# Patient Record
Sex: FEMALE | Race: WHITE | NOT HISPANIC OR LATINO | Employment: FULL TIME | URBAN - METROPOLITAN AREA
[De-identification: names, ages, dates, MRNs, and addresses within clinical notes are randomized per-mention and may not be internally consistent; named-entity substitution may affect disease eponyms.]

---

## 2020-09-15 ENCOUNTER — APPOINTMENT (OUTPATIENT)
Dept: PREADMISSION TESTING | Facility: HOSPITAL | Age: 20
End: 2020-09-15

## 2020-09-15 PROCEDURE — U0004 COV-19 TEST NON-CDC HGH THRU: HCPCS

## 2020-09-15 PROCEDURE — C9803 HOPD COVID-19 SPEC COLLECT: HCPCS

## 2020-09-16 LAB — SARS-COV-2 RNA NOSE QL NAA+PROBE: NOT DETECTED

## 2021-06-15 ENCOUNTER — OFFICE VISIT (OUTPATIENT)
Dept: OBSTETRICS AND GYNECOLOGY | Facility: CLINIC | Age: 21
End: 2021-06-15

## 2021-06-15 ENCOUNTER — TELEPHONE (OUTPATIENT)
Dept: OBSTETRICS AND GYNECOLOGY | Facility: CLINIC | Age: 21
End: 2021-06-15

## 2021-06-15 VITALS
WEIGHT: 121 LBS | DIASTOLIC BLOOD PRESSURE: 70 MMHG | HEIGHT: 67 IN | BODY MASS INDEX: 18.99 KG/M2 | SYSTOLIC BLOOD PRESSURE: 100 MMHG

## 2021-06-15 DIAGNOSIS — N76.0 ACUTE VAGINITIS: Primary | ICD-10-CM

## 2021-06-15 DIAGNOSIS — N92.0 SPOTTING: ICD-10-CM

## 2021-06-15 PROCEDURE — 99213 OFFICE O/P EST LOW 20 MIN: CPT | Performed by: OBSTETRICS & GYNECOLOGY

## 2021-06-15 PROCEDURE — 87210 SMEAR WET MOUNT SALINE/INK: CPT | Performed by: OBSTETRICS & GYNECOLOGY

## 2021-06-15 RX ORDER — FLUCONAZOLE 150 MG/1
150 TABLET ORAL
Qty: 2 TABLET | Refills: 0 | Status: SHIPPED | OUTPATIENT
Start: 2021-06-15 | End: 2021-06-19

## 2021-06-15 RX ORDER — LEVONORGESTREL/ETHINYL ESTRADIOL AND ETHINYL ESTRADIOL 100-20(84)
KIT ORAL
COMMUNITY
Start: 2021-05-15 | End: 2021-08-17 | Stop reason: SDUPTHER

## 2021-06-15 RX ORDER — DUPILUMAB 300 MG/2ML
INJECTION, SOLUTION SUBCUTANEOUS
COMMUNITY
Start: 2021-05-19

## 2021-06-15 NOTE — TELEPHONE ENCOUNTER
"OP pt c/o a \"polyp\" on the inside of the right labia reports it has been bleeding and burns when she urinates. Discussed with UNIQUE as she has openings today and she is ok with seeing her. Scheduled for 310 today. Instructed to leave a CCUA upon arrival. She VU   "

## 2021-06-17 LAB
A VAGINAE DNA VAG QL NAA+PROBE: NORMAL SCORE
BVAB2 DNA VAG QL NAA+PROBE: NORMAL SCORE
C ALBICANS DNA VAG QL NAA+PROBE: NEGATIVE
C GLABRATA DNA VAG QL NAA+PROBE: NEGATIVE
C TRACH DNA VAG QL NAA+PROBE: NEGATIVE
MEGA1 DNA VAG QL NAA+PROBE: NORMAL SCORE
N GONORRHOEA DNA VAG QL NAA+PROBE: NEGATIVE
T VAGINALIS DNA VAG QL NAA+PROBE: NEGATIVE

## 2021-07-01 LAB — WET PREP GENITAL: ABNORMAL

## 2021-08-17 ENCOUNTER — OFFICE VISIT (OUTPATIENT)
Dept: OBSTETRICS AND GYNECOLOGY | Facility: CLINIC | Age: 21
End: 2021-08-17

## 2021-08-17 VITALS
WEIGHT: 119.4 LBS | HEIGHT: 67 IN | SYSTOLIC BLOOD PRESSURE: 112 MMHG | DIASTOLIC BLOOD PRESSURE: 66 MMHG | BODY MASS INDEX: 18.74 KG/M2

## 2021-08-17 DIAGNOSIS — N93.9 ABNORMAL UTERINE BLEEDING (AUB): ICD-10-CM

## 2021-08-17 DIAGNOSIS — Z01.419 WOMEN'S ANNUAL ROUTINE GYNECOLOGICAL EXAMINATION: Primary | ICD-10-CM

## 2021-08-17 PROCEDURE — 99395 PREV VISIT EST AGE 18-39: CPT | Performed by: OBSTETRICS & GYNECOLOGY

## 2021-08-17 RX ORDER — LEVONORGESTREL/ETHINYL ESTRADIOL AND ETHINYL ESTRADIOL 100-20(84)
1 KIT ORAL DAILY
Qty: 91 TABLET | Refills: 3 | Status: SHIPPED | OUTPATIENT
Start: 2021-08-17

## 2021-08-17 NOTE — PROGRESS NOTES
GYN Annual Exam     CC - Here for annual exam.        HPI  Sarah Benito is a 21 y.o. female, , who presents for annual well woman exam. No LMP recorded. (Menstrual status: Oral contraceptives)..  Periods are regular every 25-35 days, lasting 4 days. .  Dysmenorrhea:none.  Patient reports problems with: on and off brown d/c for the last month. .  There were no changes to her medical or surgical history since her last visit.. Partner Status: Marital Status: single.  She is sexually active. She has not had new partners since her last STD testing. She does not desire STD testing. . She has had her HPV vaccines.     Additional OB/GYN History   Current contraception: contraceptive methods: pills  Desires to: continue contraception  Last Pap :   Last Completed Pap Smear     This patient has no relevant Health Maintenance data.        History of abnormal Pap smear: no  Family history of uterine, colon, breast, or ovarian cancer: no  Performs monthly Self-Breast Exam: no  Exercises Regularly:yes  Feelings of Anxiety or Depression: no  Tobacco Usage?: No   OB History        0    Para   0    Term   0       0    AB   0    Living   0       SAB   0    TAB   0    Ectopic   0    Molar   0    Multiple   0    Live Births   0                Health Maintenance   Topic Date Due   • Annual Gynecologic Pelvic and Breast Exam  Never done   • ANNUAL PHYSICAL  Never done   • HPV VACCINES (1 - 2-dose series) Never done   • COVID-19 Vaccine (1) Never done   • TDAP/TD VACCINES (1 - Tdap) Never done   • HEPATITIS C SCREENING  Never done   • PAP SMEAR  Never done   • INFLUENZA VACCINE  10/01/2021   • Pneumococcal Vaccine 0-64  Aged Out   • MENINGOCOCCAL VACCINE  Aged Out       The additional following portions of the patient's history were reviewed and updated as appropriate: allergies, current medications, past family history, past medical history, past social history, past surgical history and problem list.    Review of  "Systems   Constitutional: Negative.    HENT: Negative.    Eyes: Negative.    Respiratory: Negative.    Cardiovascular: Negative.    Gastrointestinal: Negative.    Endocrine: Negative.    Genitourinary: Positive for vaginal discharge.   Musculoskeletal: Negative.    Skin: Negative.    Allergic/Immunologic: Negative.    Neurological: Negative.    Hematological: Negative.    Psychiatric/Behavioral: Negative.          I have reviewed and agree with the HPI, ROS, and historical information as entered above. Bruna Melgoza MD    Objective   /66   Ht 170.2 cm (67.01\")   Wt 54.2 kg (119 lb 6.4 oz)   Breastfeeding No   BMI 18.70 kg/m²     Physical Exam  Vitals and nursing note reviewed. Exam conducted with a chaperone present.   Constitutional:       Appearance: She is well-developed.   HENT:      Head: Normocephalic and atraumatic.   Neck:      Thyroid: No thyroid mass or thyromegaly.   Cardiovascular:      Rate and Rhythm: Normal rate and regular rhythm.      Heart sounds: No murmur heard.     Pulmonary:      Effort: Pulmonary effort is normal. No retractions.      Breath sounds: Normal breath sounds. No wheezing, rhonchi or rales.   Chest:      Chest wall: No mass or tenderness.      Breasts:         Right: Normal. No mass, nipple discharge, skin change or tenderness.         Left: Normal. No mass, nipple discharge, skin change or tenderness.   Abdominal:      General: Bowel sounds are normal.      Palpations: Abdomen is soft. Abdomen is not rigid. There is no mass.      Tenderness: There is no abdominal tenderness. There is no guarding.      Hernia: No hernia is present. There is no hernia in the left inguinal area.   Genitourinary:     Labia:         Right: No rash, tenderness or lesion.         Left: No rash, tenderness or lesion.       Vagina: Normal. No vaginal discharge or lesions.      Cervix: No cervical motion tenderness, discharge, lesion or cervical bleeding.      Uterus: Normal. Not enlarged, not " fixed and not tender.       Adnexa:         Right: No mass or tenderness.          Left: No mass or tenderness.        Rectum: No external hemorrhoid.   Musculoskeletal:      Cervical back: Normal range of motion. No muscular tenderness.   Neurological:      Mental Status: She is alert and oriented to person, place, and time.   Psychiatric:         Behavior: Behavior normal.            Assessment and Plan    Problem List Items Addressed This Visit     None      Visit Diagnoses     Women's annual routine gynecological examination    -  Primary    Relevant Orders    Pap IG, Ct-Ng, Rfx HPV ASCU    Abnormal uterine bleeding (AUB)              1. GYN annual well woman exam.   2. OCP's/Vaginal Ring - Discussed side effects of nausea, BTB, headaches, breast tenderness and slight weight gain in the first three cycles.  Understands risks of blood clots, stroke, and theoretical risk of breast cancer.  Denies family history of blood clots.  3. Reviewed monthly self breast exams.  Instructed to call with lumps, pain, or breast discharge.    4. RTC in 1 year or PRN with problems  5. Other: Will try 2 pills a day and may need new if AUP persists   6. Will start 2 pills a day for 5 days Nita Melgoza MD  08/17/2021

## 2021-08-18 ENCOUNTER — TELEPHONE (OUTPATIENT)
Dept: OBSTETRICS AND GYNECOLOGY | Facility: CLINIC | Age: 21
End: 2021-08-18

## 2021-08-18 NOTE — TELEPHONE ENCOUNTER
Patient requested generic form of her birth control pill that MD sent; pharmacy verifying that it was ok. Verbal given and accepted.

## 2021-08-24 DIAGNOSIS — Z01.419 WOMEN'S ANNUAL ROUTINE GYNECOLOGICAL EXAMINATION: ICD-10-CM

## 2022-03-07 ENCOUNTER — TELEPHONE (OUTPATIENT)
Dept: OBSTETRICS AND GYNECOLOGY | Facility: CLINIC | Age: 22
End: 2022-03-07

## 2022-03-07 NOTE — TELEPHONE ENCOUNTER
PATIENT IS NEEDING AN STD SCREENING DONE. SHE SAID SHE WORKS TODAY AND TOMORROW. SHE HAS AN APPT ON 3/10 W/ DR. QUEVEDO. STATES WED WOULD BE BETTER. PLEASE ADVISE.

## 2022-03-10 ENCOUNTER — OFFICE VISIT (OUTPATIENT)
Dept: OBSTETRICS AND GYNECOLOGY | Facility: CLINIC | Age: 22
End: 2022-03-10

## 2022-03-10 VITALS
SYSTOLIC BLOOD PRESSURE: 110 MMHG | HEIGHT: 67 IN | DIASTOLIC BLOOD PRESSURE: 70 MMHG | BODY MASS INDEX: 18.68 KG/M2 | WEIGHT: 119 LBS

## 2022-03-10 DIAGNOSIS — Z11.3 SCREENING FOR STD (SEXUALLY TRANSMITTED DISEASE): Primary | ICD-10-CM

## 2022-03-10 DIAGNOSIS — B37.9 YEAST INFECTION: ICD-10-CM

## 2022-03-10 PROCEDURE — 99213 OFFICE O/P EST LOW 20 MIN: CPT | Performed by: OBSTETRICS & GYNECOLOGY

## 2022-03-10 RX ORDER — FLUCONAZOLE 150 MG/1
TABLET ORAL
Qty: 2 TABLET | Refills: 3 | Status: SHIPPED | OUTPATIENT
Start: 2022-03-10

## 2022-03-10 NOTE — PROGRESS NOTES
"Chief Complaint   Patient presents with   • Vaginal Itching         Subjective   HPI  Sarah Benito is a 22 y.o. female, , who presents for screening for STD's. Patient states thinks possible yeast. She has been using monistat OTC with some relief.     She is also wanting to restart ocps. States stopped 2021 due to BTB. She plans to start after next period. States no period 2022, 2 episodes of bleeding last month that lasted 7 days. Hospitals in [place] had 2 negative upt 2022.     Her last LMP was Patient's last menstrual period was 2022.. She reports sexual contact with individual with uncertain background unknown weeks ago. The patient reports vaginal discharge.  She describes this as clear and vulvar itching. These symptoms have been present for 5 day(s). She is requesting STD testing without blood work. The patient denies history of sexually transmitted disease..      Additional OB/GYN History   Last Pap :   Last Completed Pap Smear          Ordered - PAP SMEAR (Every 3 Years) Ordered on 2021  SCANNED - PAP SMEAR              History of abnormal Pap smear: no  OB History        0    Para   0    Term   0       0    AB   0    Living   0       SAB   0    IAB   0    Ectopic   0    Molar   0    Multiple   0    Live Births   0                The additional following portions of the patient's history were reviewed and updated as appropriate: allergies, current medications, past family history, past medical history, past social history and past surgical history.    Review of Systems   Genitourinary: Positive for vaginal discharge.     All other systems reviewed and are negative.     I have reviewed and agree with the HPI, ROS, and historical information as entered above. Bruna Melgoza MD    Objective   /70   Ht 170.2 cm (67\")   Wt 54 kg (119 lb)   LMP 2022   BMI 18.64 kg/m²     Physical Exam  Vitals and nursing note reviewed. Exam conducted with a chaperone " present.   Genitourinary:     Labia:         Right: No rash, tenderness or lesion.         Left: No rash, tenderness or lesion.       Vagina: Vaginal discharge present. No lesions.      Cervix: No cervical motion tenderness, discharge, lesion or cervical bleeding.      Uterus: Normal. Not enlarged, not fixed and not tender.       Adnexa:         Right: No mass or tenderness.          Left: No mass or tenderness.        Rectum: No external hemorrhoid.      Comments: Chaperone Present minor discharge         Wet mount performed? Yes. Pertinent positives include: Yeast Buds    Assessment/Plan     Assessment and Plan    Problem List Items Addressed This Visit    None     Visit Diagnoses     Screening for STD (sexually transmitted disease)    -  Primary    Relevant Orders    Chlamydia trachomatis, Neisseria gonorrhoeae, Trichomonas vaginalis, PCR - Swab, Cervix          1. Discussed safe sexual practice in detail  2. Appropriate educational material was distributed  3. See orders for STD cultures and assays  4. RTC PRN  5. Will rx for yeast and do STD cultures        Bruna Melgoza MD  03/10/2022

## 2022-03-12 LAB
C TRACH DNA SPEC QL NAA+PROBE: NEGATIVE
N GONORRHOEA DNA SPEC QL NAA+PROBE: NEGATIVE
T VAGINALIS DNA SPEC QL NAA+PROBE: NEGATIVE

## 2022-08-18 ENCOUNTER — OFFICE VISIT (OUTPATIENT)
Dept: OBSTETRICS AND GYNECOLOGY | Facility: CLINIC | Age: 22
End: 2022-08-18

## 2022-08-18 VITALS
DIASTOLIC BLOOD PRESSURE: 54 MMHG | BODY MASS INDEX: 19.56 KG/M2 | SYSTOLIC BLOOD PRESSURE: 100 MMHG | WEIGHT: 124.6 LBS | HEIGHT: 67 IN

## 2022-08-18 DIAGNOSIS — Z01.419 PAP TEST, AS PART OF ROUTINE GYNECOLOGICAL EXAMINATION: Primary | ICD-10-CM

## 2022-08-18 DIAGNOSIS — Z11.3 SCREENING EXAMINATION FOR STD (SEXUALLY TRANSMITTED DISEASE): ICD-10-CM

## 2022-08-18 DIAGNOSIS — N93.9 ABNORMAL UTERINE BLEEDING (AUB): ICD-10-CM

## 2022-08-18 PROCEDURE — 99395 PREV VISIT EST AGE 18-39: CPT | Performed by: OBSTETRICS & GYNECOLOGY

## 2022-08-18 NOTE — PROGRESS NOTES
Gynecologic Annual Exam Note        Gynecologic Exam        Subjective     HPI  Sarah Benito is a 22 y.o.  female who presents for annual well woman exam as a established patient. There were no changes to her medical or surgical history since her last visit.. Patient reports problems with: abnormal bleeding with and without birth control pills. Patient's last menstrual period was 2022.. Her periods are irregular occurring every 2  weeks, lasting 5 days. The flow is moderate. Dysmenorrhea:mild, occurring premenstrually and throughout menses. . Partner Status: Marital Status: single.  She is sexually active. She has had new partners.. STD testing recommendations have been explained to the patient and she does desire STD testing.    Additional OB/GYN History   Current contraception: contraceptive methods: Condoms  Desires to: change to possible IUD, discuss other options contraception  Thromboembolic Disease: none  Age of menarche: 12    History of STD: no    Last Pap : 2021. Results: negative. HPV: not done  Last Completed Pap Smear          Ordered - PAP SMEAR (Every 3 Years) Ordered on 2021  SCANNED - PAP SMEAR                 History of abnormal Pap smear: no  Gardasil status:completed  Family history of uterine, colon, breast, or ovarian cancer: no  Performs monthly Self-Breast Exam: no  Exercises Regularly:yes  Feelings of Anxiety or Depression: no  Tobacco Usage?: No       Current Outpatient Medications:   •  Dupixent 300 MG/2ML solution prefilled syringe, , Disp: , Rfl:   •  Camrese Lo 0.1-0.02 & 0.01 MG tablet, Take 1 tablet by mouth Daily., Disp: 91 tablet, Rfl: 3  •  fluconazole (Diflucan) 150 MG tablet, 1 po now and repeat 1 week, Disp: 2 tablet, Rfl: 3  •  terconazole (Terazol 7) 0.4 % vaginal cream, Apply thin film to vulva daily x 7 days, Disp: 45 g, Rfl: 0     Patient denies the need for medication refills today.    OB History        0    Para   0     "Term   0       0    AB   0    Living   0       SAB   0    IAB   0    Ectopic   0    Molar   0    Multiple   0    Live Births   0                Health Maintenance   Topic Date Due   • COVID-19 Vaccine (1) Never done   • ANNUAL PHYSICAL  Never done   • HPV VACCINES (1 - 2-dose series) Never done   • HEPATITIS C SCREENING  Never done   • TDAP/TD VACCINES (2 - Td or Tdap) 2021   • Annual Gynecologic Pelvic and Breast Exam  2022   • INFLUENZA VACCINE  10/01/2022   • CHLAMYDIA SCREENING  03/10/2023   • PAP SMEAR  2024   • Pneumococcal Vaccine 0-64  Aged Out   • MENINGOCOCCAL VACCINE  Aged Out       Past Medical History:   Diagnosis Date   • Abnormal uterine bleeding (AUB)         Past Surgical History:   Procedure Laterality Date   • NASAL SEPTUM SURGERY         The additional following portions of the patient's history were reviewed and updated as appropriate: allergies, current medications, past family history, past medical history, past social history and past surgical history.    Review of Systems   Constitutional: Negative.    HENT: Negative.    Respiratory: Negative.    Cardiovascular: Negative.    Gastrointestinal: Negative.    Genitourinary: Positive for menstrual problem.   Musculoskeletal: Negative.    Skin: Negative.    Allergic/Immunologic: Negative.    Neurological: Negative.    Hematological: Negative.    Psychiatric/Behavioral: Negative.          I have reviewed and agree with the HPI, ROS, and historical information as entered above. Bruna Melgoza MD        Objective   /54   Ht 170.2 cm (67\")   Wt 56.5 kg (124 lb 9.6 oz)   LMP 2022   BMI 19.52 kg/m²     Physical Exam  Vitals and nursing note reviewed. Exam conducted with a chaperone present.   Constitutional:       Appearance: She is well-developed.   HENT:      Head: Normocephalic and atraumatic.   Neck:      Thyroid: No thyroid mass or thyromegaly.   Cardiovascular:      Rate and Rhythm: Normal rate and regular " rhythm.      Heart sounds: No murmur heard.  Pulmonary:      Effort: Pulmonary effort is normal. No retractions.      Breath sounds: Normal breath sounds. No wheezing, rhonchi or rales.   Chest:      Chest wall: No mass or tenderness.   Breasts:      Right: Normal. No mass, nipple discharge, skin change or tenderness.      Left: Normal. No mass, nipple discharge, skin change or tenderness.       Abdominal:      General: Bowel sounds are normal.      Palpations: Abdomen is soft. Abdomen is not rigid. There is no mass.      Tenderness: There is no abdominal tenderness. There is no guarding.      Hernia: No hernia is present. There is no hernia in the left inguinal area.   Genitourinary:     Labia:         Right: No rash, tenderness or lesion.         Left: No rash, tenderness or lesion.       Vagina: Normal. No vaginal discharge or lesions.      Cervix: No cervical motion tenderness, discharge, lesion or cervical bleeding.      Uterus: Normal. Not enlarged, not fixed and not tender.       Adnexa:         Right: No mass or tenderness.          Left: No mass or tenderness.        Rectum: No external hemorrhoid.   Musculoskeletal:      Cervical back: Normal range of motion. No muscular tenderness.   Neurological:      Mental Status: She is alert and oriented to person, place, and time.   Psychiatric:         Behavior: Behavior normal.            Assessment and Plan    Problem List Items Addressed This Visit    None     Visit Diagnoses     Pap test, as part of routine gynecological examination    -  Primary    Relevant Orders    LIQUID-BASED PAP SMEAR, [facility] ([facility])    Abnormal uterine bleeding (AUB)           OCPs caused AUBand off x 8 mo = acne gone and she wants Mirena as her AUB persists  1. GYN annual well woman exam.   2. Reviewed pap guidelines.   3. Reviewed monthly self breast exams.  Instructed to call with lumps, pain, or breast discharge.    4. RTC in 1 year or PRN with problems  5. Other: Needs  Mirena  Return for Needs Mirena IUD in 4 weeks.      Bruna Melgoza MD  08/18/2022

## 2022-08-23 ENCOUNTER — TELEPHONE (OUTPATIENT)
Dept: OBSTETRICS AND GYNECOLOGY | Facility: CLINIC | Age: 22
End: 2022-08-23

## 2022-08-23 LAB — REF LAB TEST METHOD: NORMAL

## 2022-08-23 NOTE — TELEPHONE ENCOUNTER
PT STATED SHE WAS INSTRUCTED TO CALL BACK WHEN SHE STARTED HER PERIOD TO GET HER MIRENA PLACE, NO OPENINGS UNTIL 9-1-22 PT STATED THAT WOULD BE TOO FAR AWAY ASKED IF POSSIBLE TO GET WORKED IN

## 2022-08-23 NOTE — TELEPHONE ENCOUNTER
Per Dr. Mcmullen last OV note on 8/18/22:    Patient needs to RTO in 4 weeks for Mirena IUD insertion.       Nina can override this appt for you.

## 2022-08-29 ENCOUNTER — TELEPHONE (OUTPATIENT)
Dept: OBSTETRICS AND GYNECOLOGY | Facility: CLINIC | Age: 22
End: 2022-08-29

## 2022-08-29 DIAGNOSIS — Z11.3 SCREENING EXAMINATION FOR STD (SEXUALLY TRANSMITTED DISEASE): Primary | ICD-10-CM

## 2022-08-29 NOTE — TELEPHONE ENCOUNTER
S/w pt she was calling in regards to recent pap smear results and states she was suppose to have STD testing done in the pap smear and I told the patient that it was not ordered and I would reach out to [facility] and see if they can add this test on and CB with more information. She v/u

## 2022-08-29 NOTE — TELEPHONE ENCOUNTER
"S/w Yrn at [facility] lab , he stated they can add STD testing to the current pap smear performed on 8/18. Requested we print off pap order and write \"add STD testing\" and fax to : [phone]    This has been faxed to [facility]    Pt. Has been notified and v/u  "

## 2022-09-22 ENCOUNTER — OFFICE VISIT (OUTPATIENT)
Dept: OBSTETRICS AND GYNECOLOGY | Facility: CLINIC | Age: 22
End: 2022-09-22

## 2022-09-22 VITALS
SYSTOLIC BLOOD PRESSURE: 115 MMHG | DIASTOLIC BLOOD PRESSURE: 70 MMHG | HEIGHT: 67 IN | BODY MASS INDEX: 19.81 KG/M2 | WEIGHT: 126.2 LBS

## 2022-09-22 DIAGNOSIS — Z30.430 ENCOUNTER FOR IUD INSERTION: Primary | ICD-10-CM

## 2022-09-22 LAB
B-HCG UR QL: NEGATIVE
EXPIRATION DATE: NORMAL
INTERNAL NEGATIVE CONTROL: NORMAL
INTERNAL POSITIVE CONTROL: NORMAL
Lab: NORMAL

## 2022-09-22 PROCEDURE — 58300 INSERT INTRAUTERINE DEVICE: CPT | Performed by: OBSTETRICS & GYNECOLOGY

## 2022-09-22 PROCEDURE — 81025 URINE PREGNANCY TEST: CPT | Performed by: OBSTETRICS & GYNECOLOGY

## 2022-09-22 NOTE — PROGRESS NOTES
Gynecologic Procedure Note        Procedure: IUD Insertion     Procedures    Pre procedure indication 1) Desires Mirena  Post procedure indication 1) Desires Mirena    NDC: Mirena 30354-362-59  Lot #: [id number]  Exp Date: 11/01/2024  BH device    The risks, benefits, and alternatives to Mirena were explained at length with the patient. All her questions were answered and consents were signed.  Her LMP was 9/20/22 .  Urine Pregnancy Test was Negative.  Patient does not have an allergy to betadine or shellfish.    The patient was placed in a dorsal lithotomy position on the examining table in [facility]. A bimanual exam confirmed the uterus was midposition. A speculum was inserted into the vagina and the cervix was brought into view.  The cervix was prepped with Betadine. The anterior lip of the cervix was then grasped with a single-tooth tenaculum. The endometrial cavity was then sounded to 8 centimeters. The sealed Mirena package was opened and the IUD was removed in a sterile fashion.    The upper edge of the depth setting the flange was set at the uterine sound measurement. The  was then carefully advanced to the cervical canal into the uterus to the level of the fundus.  The slider was then retracted about 1 cm and deployed the device. The device was then gently advanced to the fundus. The IUD was then released by pulling the slider down all the way. The  was removed carefully from the uterus. The threads were then cut leaving 2-3 cm visible outside of the cervix.  The single-tooth tenaculum was removed from the anterior lip. Good hemostasis was noted.   All other instruments were removed from the vagina.       The patient tolerated the procedure very well with a mild amount of discomfort.  She was monitored for 5 minutes prior to discharge.      There were no complications.    The patient was counseled about the need to return in 4 weeks for IUD check.     She was counseled about the need to use  a backup method of contraception such as condoms until her post insertion exam was performed. The patient verbalized understanding when the IUD will need to be removed/replaced. Written information was given to the patient.  The patient is counseled to contact us if she has any significant or increasing bleeding, pain, fever, chills, or other concerns. She is instructed to see a doctor right away if she believes that she may be pregnant at any time with the IUD in place US confirmed correct position.    Bruna Melgoza MD  09/22/2022

## 2022-10-20 LAB
REF LAB TEST METHOD: NORMAL
REF LAB TEST METHOD: NORMAL

## 2022-11-03 ENCOUNTER — TELEPHONE (OUTPATIENT)
Dept: OBSTETRICS AND GYNECOLOGY | Facility: CLINIC | Age: 22
End: 2022-11-03

## 2022-11-03 NOTE — TELEPHONE ENCOUNTER
Provider: DR QUEVEDO  Caller: NINFA CRANDALL  Relationship to Patient: SELF    Reason for Call: PT HAS HAD BLEEDING SINCE HER IUD WAS INSERTED.  SHE IS SUPPOSE TO GO ON A CRUISE AND WOULD LIKE TO KNOW IF THERES ANYTHING SHE CAN DO TO MAKE HERSELF COMFORTABLE DURING THAT TIME (NOT BLEEDING). SHE HASNT HAD HER 1 MONTH FOLLOW UP DUE TO HER WORK SCHEDULE.  PLEASE ADVISE

## 2022-11-10 ENCOUNTER — OFFICE VISIT (OUTPATIENT)
Dept: OBSTETRICS AND GYNECOLOGY | Facility: CLINIC | Age: 22
End: 2022-11-10

## 2022-11-10 VITALS — SYSTOLIC BLOOD PRESSURE: 100 MMHG | BODY MASS INDEX: 19.14 KG/M2 | WEIGHT: 122.2 LBS | DIASTOLIC BLOOD PRESSURE: 68 MMHG

## 2022-11-10 DIAGNOSIS — Z30.431 IUD CHECK UP: ICD-10-CM

## 2022-11-10 DIAGNOSIS — Z32.00 POSSIBLE PREGNANCY, NOT CONFIRMED: Primary | ICD-10-CM

## 2022-11-10 DIAGNOSIS — N92.0 SPOTTING: ICD-10-CM

## 2022-11-10 PROCEDURE — 81025 URINE PREGNANCY TEST: CPT | Performed by: OBSTETRICS & GYNECOLOGY

## 2022-11-10 PROCEDURE — 99212 OFFICE O/P EST SF 10 MIN: CPT | Performed by: OBSTETRICS & GYNECOLOGY

## 2022-11-10 NOTE — PROGRESS NOTES
Chief Complaint   Patient presents with   • Gynecologic Exam         Subjective   HPI  Sarah Benito is a 22 y.o. female, , who presents for IUD check follow up.  She had a Mirena placed on 22. Since the IUD placement, the patient reports spotting. She has not had a period since the IUD was placed.    The additional following portions of the patient's history were reviewed and updated as appropriate: allergies and current medications.    Did the patient have u/s today? No.    Review of Systems  All other systems reviewed and are negative.     I have reviewed and agree with the HPI, ROS, and historical information as entered above. Bruna Melgoza MD    Objective   /68   Wt 55.4 kg (122 lb 3.2 oz)   BMI 19.14 kg/m²     Physical Exam  Vitals and nursing note reviewed. Exam conducted with a chaperone present.   Genitourinary:     Labia:         Right: No rash, tenderness or lesion.         Left: No rash, tenderness or lesion.       Vagina: Normal. No lesions.      Cervix: No cervical motion tenderness, discharge, lesion or cervical bleeding.      Uterus: Normal. Not enlarged, not fixed and not tender.       Adnexa:         Right: No mass or tenderness.          Left: No mass or tenderness.        Rectum: No external hemorrhoid.            Comments: Chaperone Present        Assessment & Plan     Assessment     Problem List Items Addressed This Visit        Genitourinary and Reproductive     Spotting   Other Visit Diagnoses     Possible pregnancy, not confirmed    -  Primary    IUD check up            US confirms correct position and she will take her ocps for 1 mo while on a cruise  1.     Plan     1. Call or return if symptoms worsen or persist  Return if symptoms worsen or fail to improve.      Bruna Melgoza MD  11/10/2022

## 2022-11-23 ENCOUNTER — TELEPHONE (OUTPATIENT)
Dept: OBSTETRICS AND GYNECOLOGY | Facility: CLINIC | Age: 22
End: 2022-11-23

## 2022-11-23 RX ORDER — FLUCONAZOLE 150 MG/1
TABLET ORAL
Qty: 2 TABLET | Refills: 0 | Status: SHIPPED | OUTPATIENT
Start: 2022-11-23

## 2023-04-24 RX ORDER — FLUCONAZOLE 150 MG/1
TABLET ORAL
Qty: 2 TABLET | Refills: 0 | OUTPATIENT
Start: 2023-04-24

## 2023-06-12 ENCOUNTER — OFFICE VISIT (OUTPATIENT)
Dept: OBSTETRICS AND GYNECOLOGY | Facility: CLINIC | Age: 23
End: 2023-06-12
Payer: COMMERCIAL

## 2023-06-12 VITALS — DIASTOLIC BLOOD PRESSURE: 64 MMHG | BODY MASS INDEX: 17.89 KG/M2 | SYSTOLIC BLOOD PRESSURE: 102 MMHG | WEIGHT: 114.2 LBS

## 2023-06-12 DIAGNOSIS — Z11.3 SCREENING EXAMINATION FOR STD (SEXUALLY TRANSMITTED DISEASE): Primary | ICD-10-CM

## 2023-06-12 NOTE — PROGRESS NOTES
Chief Complaint   Patient presents with    Follow-up         Subjective   HPI  Sarah Benito is a 23 y.o. female, , who presents for screening for STD's.     Patient does not have periods due to having Mirena. She reports no known exposure but ex was unfaithful to her. She reports she has a new sexual partner and wants to make sure she won't pass anything along.  She has had no symptoms, but did have 2 yeast infections back to back last month. Symptoms were itching and thicker discharge.          Additional OB/GYN History   Last Pap :   Last Completed Pap Smear            PAP SMEAR (Every 3 Years) Next due on 2022  CHLAMYDIA/GONORRHOEAE, [facility] - , Cervix    2022  BACTERIAL VAGINOSIS PANEL, [facility]) - , Cervix    2022  LIQUID-BASED PAP SMEAR, [facility] ([facility])    2021  SCANNED - PAP SMEAR                    OB History          0    Para   0    Term   0       0    AB   0    Living   0         SAB   0    IAB   0    Ectopic   0    Molar   0    Multiple   0    Live Births   0                  Current Outpatient Medications:     Camrese Lo 0.1-0.02 & 0.01 MG tablet, Take 1 tablet by mouth Daily., Disp: 91 tablet, Rfl: 3    Dupixent 300 MG/2ML solution prefilled syringe, , Disp: , Rfl:     fluconazole (Diflucan) 150 MG tablet, 1 po now and repeat 1 week, Disp: 2 tablet, Rfl: 3    fluconazole (Diflucan) 150 MG tablet, Take 1 tablet today and repeat in 3 days., Disp: 2 tablet, Rfl: 0    terconazole (Terazol 7) 0.4 % vaginal cream, Apply thin film to vulva daily x 7 days, Disp: 45 g, Rfl: 0     Past Medical History:   Diagnosis Date    Abnormal uterine bleeding (AUB)         Past Surgical History:   Procedure Laterality Date    NASAL SEPTUM SURGERY         The additional following portions of the patient's history were reviewed and updated as appropriate: allergies and current medications.    Review of Systems   Constitutional:  Negative.    Cardiovascular: Negative.    Gastrointestinal: Negative.    Genitourinary: Negative.    All other systems reviewed and are negative.     I have reviewed and agree with the HPI, ROS, and historical information as entered above. YANIV Nation      Objective   /64   Wt 51.8 kg (114 lb 3.2 oz)   BMI 17.89 kg/m²     Physical Exam  Vitals and nursing note reviewed. Exam conducted with a chaperone present.   Constitutional:       Appearance: Normal appearance. She is normal weight.   HENT:      Head: Normocephalic.   Pulmonary:      Effort: Pulmonary effort is normal.   Abdominal:      Palpations: Abdomen is soft.   Genitourinary:     General: Normal vulva.      Exam position: Lithotomy position.      Labia:         Right: No rash, tenderness or lesion.         Left: No rash, tenderness or lesion.       Vagina: Normal. No vaginal discharge or lesions.      Cervix: No cervical motion tenderness, discharge, friability, lesion or erythema.      Comments: Strings present  Neurological:      Mental Status: She is alert and oriented to person, place, and time.       Wet mount performed? No.    Assessment & Plan     Assessment and Plan    Problem List Items Addressed This Visit    None      Discussed safe sexual practice in detail  STD cultures obtained, will add BV/Yeast as well.   Will call pt with results        YANIV Nation  06/12/2023

## 2023-06-16 LAB
C ALBICANS DNA VAG QL NAA+PROBE: NEGATIVE
C GLABRATA DNA VAG QL NAA+PROBE: NEGATIVE
C KRUSEI DNA VAG QL NAA+PROBE: NEGATIVE
C LUSITANIAE DNA VAG QL NAA+PROBE: NEGATIVE
C TRACH RRNA SPEC QL NAA+PROBE: NEGATIVE
CANDIDA DNA VAG QL NAA+PROBE: NEGATIVE
N GONORRHOEA RRNA SPEC QL NAA+PROBE: NEGATIVE
T VAGINALIS RRNA SPEC QL NAA+PROBE: NEGATIVE

## 2023-09-01 ENCOUNTER — OFFICE VISIT (OUTPATIENT)
Dept: OBSTETRICS AND GYNECOLOGY | Facility: CLINIC | Age: 23
End: 2023-09-01
Payer: COMMERCIAL

## 2023-09-01 VITALS — WEIGHT: 114.8 LBS | DIASTOLIC BLOOD PRESSURE: 66 MMHG | SYSTOLIC BLOOD PRESSURE: 108 MMHG | BODY MASS INDEX: 17.98 KG/M2

## 2023-09-01 DIAGNOSIS — B37.9 YEAST DETECTED: ICD-10-CM

## 2023-09-01 DIAGNOSIS — Z01.419 PAP TEST, AS PART OF ROUTINE GYNECOLOGICAL EXAMINATION: Primary | ICD-10-CM

## 2023-09-01 DIAGNOSIS — R30.0 DYSURIA: ICD-10-CM

## 2023-09-01 DIAGNOSIS — R20.9 COLD EXTREMITIES: ICD-10-CM

## 2023-09-01 LAB
BILIRUB BLD-MCNC: NEGATIVE MG/DL
GLUCOSE UR STRIP-MCNC: NEGATIVE MG/DL
KETONES UR QL: NEGATIVE
LEUKOCYTE EST, POC: NEGATIVE
NITRITE UR-MCNC: POSITIVE MG/ML
PH UR: 5.5 [PH] (ref 5–8)
PROT UR STRIP-MCNC: NEGATIVE MG/DL
RBC # UR STRIP: ABNORMAL /UL
SP GR UR: 1.03 (ref 1–1.03)
UROBILINOGEN UR QL: ABNORMAL

## 2023-09-01 PROCEDURE — 81002 URINALYSIS NONAUTO W/O SCOPE: CPT | Performed by: OBSTETRICS & GYNECOLOGY

## 2023-09-01 PROCEDURE — 99395 PREV VISIT EST AGE 18-39: CPT | Performed by: OBSTETRICS & GYNECOLOGY

## 2023-09-01 RX ORDER — NITROFURANTOIN 25; 75 MG/1; MG/1
100 CAPSULE ORAL 2 TIMES DAILY
Qty: 14 CAPSULE | Refills: 0 | Status: SHIPPED | OUTPATIENT
Start: 2023-09-01

## 2023-09-01 RX ORDER — FLUCONAZOLE 150 MG/1
TABLET ORAL
Qty: 2 TABLET | Refills: 0 | Status: SHIPPED | OUTPATIENT
Start: 2023-09-01

## 2023-09-01 NOTE — PROGRESS NOTES
Gynecologic Annual Exam Note        Gynecologic Exam        Subjective     HPI  Sarah Benito is a 23 y.o.  female who presents for annual well woman exam as a established patient. There were no changes to her medical or surgical history since her last visit.. Patient reports problems with: none. No LMP recorded. Patient has had an implant.. Her periods are absent secondary to birth control.. She reports dysmenorrhea is none. Partner Status: Marital Status: single.  She is sexually active. She has had new partners.. STD testing recommendations have been explained to the patient and she does desire STD testing.    Additional OB/GYN History   Current contraception: contraceptive methods: IUD.  Insertion date: 22  Desires to: continue contraception  Thromboembolic Disease: none  Age of menarche: 12    History of STD: no    Last Pap : 22. Results: negative. HPV: not done.   Last Completed Pap Smear            Ordered - PAP SMEAR (Every 3 Years) Ordered on 2022  CHLAMYDIA/GONORRHOEAE, [facility] - , Cervix    2022  BACTERIAL VAGINOSIS PANEL, [facility]) - , Cervix    2022  LIQUID-BASED PAP SMEAR, [facility] ([facility])    2021  SCANNED - PAP SMEAR                     History of abnormal Pap smear: no  Gardasil status:missed  Family history of uterine, colon, breast, or ovarian cancer: no  Performs monthly Self-Breast Exam: no  Exercises Regularly:yes  Feelings of Anxiety or Depression: no  Tobacco Usage?: No       Current Outpatient Medications:     Camrese Lo 0.1-0.02 & 0.01 MG tablet, Take 1 tablet by mouth Daily. (Patient not taking: Reported on 2023), Disp: 91 tablet, Rfl: 3    Dupixent 300 MG/2ML solution prefilled syringe, , Disp: , Rfl:     fluconazole (Diflucan) 150 MG tablet, 1 po now . Repeat 1 week, Disp: 2 tablet, Rfl: 0    nitrofurantoin, macrocrystal-monohydrate, (Macrobid) 100 MG capsule, Take 1 capsule by mouth 2 (Two) Times a Day.,  Disp: 14 capsule, Rfl: 0    terconazole (Terazol 7) 0.4 % vaginal cream, Apply thin film to vulva daily x 7 days (Patient not taking: Reported on 2023), Disp: 45 g, Rfl: 0     Patient denies the need for medication refills today.    OB History          0    Para   0    Term   0       0    AB   0    Living   0         SAB   0    IAB   0    Ectopic   0    Molar   0    Multiple   0    Live Births   0                Health Maintenance   Topic Date Due    BMI FOLLOWUP  Never done    COVID-19 Vaccine (1) Never done    HPV VACCINES (1 - 2-dose series) Never done    HEPATITIS C SCREENING  Never done    ANNUAL PHYSICAL  Never done    TDAP/TD VACCINES (2 - Td or Tdap) 2021    Annual Gynecologic Pelvic and Breast Exam  2023    INFLUENZA VACCINE  10/01/2023    CHLAMYDIA SCREENING  2024    PAP SMEAR  2025    Pneumococcal Vaccine 0-64  Aged Out       Past Medical History:   Diagnosis Date    Abnormal uterine bleeding (AUB)         Past Surgical History:   Procedure Laterality Date    NASAL SEPTUM SURGERY         The additional following portions of the patient's history were reviewed and updated as appropriate: allergies, current medications, past family history, past medical history, past social history, and past surgical history.    Review of Systems      I have reviewed and agree with the HPI, ROS, and historical information as entered above. Bruna Melgoza MD          Objective   /66   Wt 52.1 kg (114 lb 12.8 oz)   BMI 17.98 kg/mý     Physical Exam  Vitals and nursing note reviewed. Exam conducted with a chaperone present.   Constitutional:       Appearance: She is well-developed.   HENT:      Head: Normocephalic and atraumatic.   Neck:      Thyroid: No thyroid mass or thyromegaly.   Cardiovascular:      Rate and Rhythm: Normal rate and regular rhythm.      Heart sounds: No murmur heard.  Pulmonary:      Effort: Pulmonary effort is normal. No retractions.      Breath sounds:  Normal breath sounds. No wheezing, rhonchi or rales.   Chest:      Chest wall: No mass or tenderness.   Breasts:     Right: Normal. No mass, nipple discharge, skin change or tenderness.      Left: Normal. No mass, nipple discharge, skin change or tenderness.   Abdominal:      General: Bowel sounds are normal.      Palpations: Abdomen is soft. Abdomen is not rigid. There is no mass.      Tenderness: There is no abdominal tenderness. There is no guarding.      Hernia: No hernia is present. There is no hernia in the left inguinal area.   Genitourinary:     Labia:         Right: No rash, tenderness or lesion.         Left: No rash, tenderness or lesion.       Vagina: Normal. No vaginal discharge or lesions.      Cervix: No cervical motion tenderness, discharge, lesion or cervical bleeding.      Uterus: Normal. Not enlarged, not fixed and not tender.       Adnexa:         Right: No mass or tenderness.          Left: No mass or tenderness.        Rectum: No external hemorrhoid.   Musculoskeletal:      Cervical back: Normal range of motion. No muscular tenderness.   Neurological:      Mental Status: She is alert and oriented to person, place, and time.   Psychiatric:         Behavior: Behavior normal.          Assessment and Plan    Problem List Items Addressed This Visit    None  Visit Diagnoses       Pap test, as part of routine gynecological examination    -  Primary    Relevant Orders    LIQUID-BASED PAP SMEAR WITH HPV GENOTYPING IF ASCUS ([facility])    Yeast detected        Relevant Medications    fluconazole (Diflucan) 150 MG tablet            GYN annual well woman exam.   Reviewed pap guidelines.   Recommended use of Vitamin D replacement and getting adequate calcium in her diet. (1500mg)  Reviewed monthly self breast exams.  Instructed to call with lumps, pain, or breast discharge.    Reviewed BMI and weight loss as preventative health measures.   Reviewed exercise as a preventative health measures.   RTC in 1  year or PRN with problems  Return in about 1 year (around 9/1/2024) for Annual physical.      Bruna Melgoza MD  09/01/2023

## 2023-09-02 LAB
T4 FREE SERPL-MCNC: 1.32 NG/DL (ref 0.82–1.77)
TSH SERPL DL<=0.005 MIU/L-ACNC: 1.13 UIU/ML (ref 0.45–4.5)

## 2023-09-05 ENCOUNTER — TELEPHONE (OUTPATIENT)
Dept: OBSTETRICS AND GYNECOLOGY | Facility: CLINIC | Age: 23
End: 2023-09-05
Payer: COMMERCIAL

## 2023-09-06 LAB
BACTERIA UR CULT: ABNORMAL
BACTERIA UR CULT: ABNORMAL
OTHER ANTIBIOTIC SUSC ISLT: ABNORMAL
REF LAB TEST METHOD: NORMAL

## 2024-09-26 ENCOUNTER — OFFICE VISIT (OUTPATIENT)
Dept: OBSTETRICS AND GYNECOLOGY | Facility: CLINIC | Age: 24
End: 2024-09-26
Payer: COMMERCIAL

## 2024-09-26 VITALS
BODY MASS INDEX: 18.52 KG/M2 | SYSTOLIC BLOOD PRESSURE: 118 MMHG | DIASTOLIC BLOOD PRESSURE: 60 MMHG | HEIGHT: 67 IN | WEIGHT: 118 LBS

## 2024-09-26 DIAGNOSIS — Z01.419 WOMEN'S ANNUAL ROUTINE GYNECOLOGICAL EXAMINATION: ICD-10-CM

## 2024-09-26 DIAGNOSIS — Z01.419 PAP TEST, AS PART OF ROUTINE GYNECOLOGICAL EXAMINATION: Primary | ICD-10-CM

## 2024-09-26 LAB
BILIRUB BLD-MCNC: NEGATIVE MG/DL
GLUCOSE UR STRIP-MCNC: NEGATIVE MG/DL
KETONES UR QL: NEGATIVE
LEUKOCYTE EST, POC: NEGATIVE
NITRITE UR-MCNC: NEGATIVE MG/ML
PH UR: 6 [PH] (ref 5–8)
PROT UR STRIP-MCNC: NEGATIVE MG/DL
RBC # UR STRIP: NEGATIVE /UL
SP GR UR: 1 (ref 1–1.03)
UROBILINOGEN UR QL: NORMAL

## 2024-09-26 PROCEDURE — 81002 URINALYSIS NONAUTO W/O SCOPE: CPT | Performed by: OBSTETRICS & GYNECOLOGY

## 2024-09-26 PROCEDURE — 99395 PREV VISIT EST AGE 18-39: CPT | Performed by: OBSTETRICS & GYNECOLOGY

## 2024-09-26 NOTE — PROGRESS NOTES
Gynecologic Annual Exam Note        Gynecologic Exam        Subjective     HPI  Sarah Benito is a 24 y.o.  female who presents for annual well woman exam as a established patient. There were no changes to her medical or surgical history since her last visit.. No LMP recorded. Patient has had an implant.  Her periods are irregular due to mirena IUD.  The flow is spotting-light. She reports dysmenorrhea is mild occurring throughout menses. Marital Status: .  She is sexually active. She has not had new partners.. STD testing recommendations have been explained to the patient and she does not desire STD testing.    The patient would like to discuss the following complaints today: patient desires to conceive next spring. States h/o menorrhagia but wanting to discuss when to remove IUD. She was seen at  clinic early this month with c/o vaginal itching/irritation. States given diflucan x2 days and then clindamycin that she took x3 days. She denies current symptoms but asking to be check today for vaginal infection. Patient left [facility]UA as well.     Additional OB/GYN History   contraceptive methods: IUD.  Insertion date: 2022  Desires to: continue contraception  Thromboembolic Disease: none  History of migraines: no  Age of menarche: 13    History of STD: no    Last Pap : 2023. Results: negative. HPV:  not done .   Last Completed Pap Smear       This patient has no relevant Health Maintenance data.             History of abnormal Pap smear: no  Gardasil status:completed  Family history of uterine, colon, breast, or ovarian cancer: no  Performs monthly Self-Breast Exam: yes  Exercises Regularly:yes  Feelings of Anxiety or Depression: no  Tobacco Usage?: No       Current Outpatient Medications:     fluconazole (Diflucan) 150 MG tablet, 1 po now . Repeat 1 week, Disp: 2 tablet, Rfl: 0     Patient denies the need for medication refills today.    OB History          0    Para   0    Term  "  0       0    AB   0    Living   0         SAB   0    IAB   0    Ectopic   0    Molar   0    Multiple   0    Live Births   0                Health Maintenance   Topic Date Due    BMI FOLLOWUP  Never done    HPV VACCINES (1 - 3-dose series) Never done    HEPATITIS C SCREENING  Never done    ANNUAL PHYSICAL  Never done    COVID-19 Vaccine ( - - season) Never done    PAP SMEAR  2024    Annual Gynecologic Pelvic and Breast Exam  2024    INFLUENZA VACCINE  2024    TDAP/TD VACCINES (3 - Td or Tdap) 2034    Pneumococcal Vaccine 0-64  Aged Out    CHLAMYDIA SCREENING  Discontinued       Past Medical History:   Diagnosis Date    Abnormal uterine bleeding (AUB)         Past Surgical History:   Procedure Laterality Date    NASAL SEPTUM SURGERY      WISDOM TOOTH EXTRACTION         The additional following portions of the patient's history were reviewed and updated as appropriate: allergies, current medications, past family history, past medical history, past social history, and past surgical history.    Review of Systems   Constitutional: Negative.    HENT: Negative.     Eyes: Negative.    Respiratory: Negative.     Cardiovascular: Negative.    Gastrointestinal: Negative.    Endocrine: Negative.    Genitourinary: Negative.    Musculoskeletal: Negative.    Skin: Negative.    Allergic/Immunologic: Negative.    Neurological: Negative.    Hematological: Negative.    Psychiatric/Behavioral: Negative.           I have reviewed and agree with the HPI, ROS, and historical information as entered above. Bruna Melgoza MD          Objective   /60   Ht 170.2 cm (67\")   Wt 53.5 kg (118 lb)   BMI 18.48 kg/m²     Physical Exam  Vitals and nursing note reviewed. Exam conducted with a chaperone present.   Constitutional:       Appearance: She is well-developed.   HENT:      Head: Normocephalic and atraumatic.   Neck:      Thyroid: No thyroid mass or thyromegaly.   Cardiovascular:      Rate and " Rhythm: Normal rate and regular rhythm.      Heart sounds: No murmur heard.  Pulmonary:      Effort: Pulmonary effort is normal. No retractions.      Breath sounds: Normal breath sounds. No wheezing, rhonchi or rales.   Chest:      Chest wall: No mass or tenderness.   Breasts:     Right: Normal. No mass, nipple discharge, skin change or tenderness.      Left: Normal. No mass, nipple discharge, skin change or tenderness.   Abdominal:      General: Bowel sounds are normal.      Palpations: Abdomen is soft. Abdomen is not rigid. There is no mass.      Tenderness: There is no abdominal tenderness. There is no guarding.      Hernia: No hernia is present. There is no hernia in the left inguinal area.   Genitourinary:     Labia:         Right: No rash, tenderness or lesion.         Left: No rash, tenderness or lesion.       Vagina: Normal. No vaginal discharge or lesions.      Cervix: No cervical motion tenderness, discharge, lesion or cervical bleeding.      Uterus: Normal. Not enlarged, not fixed and not tender.       Adnexa:         Right: No mass or tenderness.          Left: No mass or tenderness.        Rectum: No external hemorrhoid.   Musculoskeletal:      Cervical back: Normal range of motion. No muscular tenderness.   Neurological:      Mental Status: She is alert and oriented to person, place, and time.   Psychiatric:         Behavior: Behavior normal.            Assessment and Plan    Problem List Items Addressed This Visit    None  Visit Diagnoses       Pap test, as part of routine gynecological examination    -  Primary    Women's annual routine gynecological examination            Wet smear is neg     GYN annual well woman exam.   Reviewed pap guidelines.   Reviewed monthly self breast exams.  Instructed to call with lumps, pain, or breast discharge.    Reviewed BMI and weight loss as preventative health measures.   Reviewed exercise as a preventative health measures.   RTC in 1 year or PRN with  problems  Return in about 1 year (around 9/26/2025) for Annual physical.    Bruna Melgoza MD  09/26/2024

## 2024-10-04 LAB — REF LAB TEST METHOD: NORMAL
